# Patient Record
Sex: FEMALE | Race: WHITE | NOT HISPANIC OR LATINO | Employment: UNEMPLOYED | URBAN - METROPOLITAN AREA
[De-identification: names, ages, dates, MRNs, and addresses within clinical notes are randomized per-mention and may not be internally consistent; named-entity substitution may affect disease eponyms.]

---
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
## 2025-04-06 DIAGNOSIS — G90.A POTS (POSTURAL ORTHOSTATIC TACHYCARDIA SYNDROME): ICD-10-CM

## 2025-04-06 RX ORDER — MIDODRINE HYDROCHLORIDE 5 MG/1
5 TABLET ORAL
Qty: 270 TABLET | Refills: 1 | Status: SHIPPED | OUTPATIENT
Start: 2025-04-06

## 2025-05-19 ENCOUNTER — VIRTUAL VISIT (OUTPATIENT)
Dept: FAMILY MEDICINE | Facility: CLINIC | Age: 20
End: 2025-05-19
Payer: COMMERCIAL

## 2025-05-19 DIAGNOSIS — G90.A POTS (POSTURAL ORTHOSTATIC TACHYCARDIA SYNDROME): Primary | ICD-10-CM

## 2025-05-19 PROCEDURE — 98005 SYNCH AUDIO-VIDEO EST LOW 20: CPT | Performed by: PHYSICIAN ASSISTANT

## 2025-05-19 NOTE — LETTER
2025    Farzana Benito   2005        To Whom it May Concern;    Please excuse Farzana Benito from work Thursday 5/15/25, 25 and 25 due to illness. She has a diagnosis of Postural Orthostatic Tachycardia Syndrome (POTS) and having a current exacerbation causing dizziness and nausea leading to inability to work.       Sincerely,        Kristen M. Kehr, PA-C

## 2025-05-19 NOTE — PROGRESS NOTES
Farzana is a 19 year old who is being evaluated via a billable video visit.    How would you like to obtain your AVS? MyChart  If the video visit is dropped, the invitation should be resent by: Send to e-mail at: [email or web address]  Will anyone else be joining your video visit? No      Assessment & Plan     POTS (postural orthostatic tachycardia syndrome)  Letter provided for her time out of work for the past few days. She already has medications and using her current treatments recommended by [facility]. If she will need long term conditions or restrictions, she will need to return to her treating provider.                 Subjective   Farzana is a 19 year old, presenting for the following health issues:  Letter for School/Work (Need letter for work missed 3 days of work due flare up of POTS. )        5/19/2025     5:22 PM   Additional Questions   Roomed by Matthew OLIVEIRA MA         5/19/2025   Forms   Any forms needing to be completed Yes     HPI      Farzana is here today due to POTS. She was diagnosed and treated for this condition through [facility].   She has seen different providers in the clinic and a refill of the midodrine was given at her routine appointment. She uses this daily along with salt intake to try to control symptoms. She is currently having an exacerbation for unknown reasons, despite her use of medications and doing her best to try to increase salt and restrict activities. She is requesting a letter for her days out of work. She feels that she can return to work tomorrow.         Review of Systems  Constitutional, HEENT, cardiovascular, pulmonary, GI, , musculoskeletal, neuro, skin, endocrine and psych systems are negative, except as otherwise noted.      Objective           Vitals:  No vitals were obtained today due to virtual visit.    Physical Exam   GENERAL: alert and no distress  EYES: Eyes grossly normal to inspection.  No discharge or erythema, or obvious scleral/conjunctival abnormalities.  RESP: No  audible wheeze, cough, or visible cyanosis.    SKIN: Visible skin clear. No significant rash, abnormal pigmentation or lesions.  NEURO: Cranial nerves grossly intact.  Mentation and speech appropriate for age.  PSYCH: Appropriate affect, tone, and pace of words          Video-Visit Details    Type of service:  Video Visit   Originating Location (pt. Location): Home    Distant Location (provider location):  On-site  Platform used for Video Visit: Viv  Signed Electronically by: Kristen M. Kehr, PA-C

## 2025-07-16 ENCOUNTER — OFFICE VISIT (OUTPATIENT)
Dept: PEDIATRICS | Facility: CLINIC | Age: 20
End: 2025-07-16

## 2025-07-16 VITALS
SYSTOLIC BLOOD PRESSURE: 123 MMHG | TEMPERATURE: 98.4 F | OXYGEN SATURATION: 100 % | HEIGHT: 65 IN | RESPIRATION RATE: 22 BRPM | WEIGHT: 130 LBS | HEART RATE: 82 BPM | BODY MASS INDEX: 21.66 KG/M2 | DIASTOLIC BLOOD PRESSURE: 68 MMHG
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
## 2025-07-16 PROCEDURE — 99213 OFFICE O/P EST LOW 20 MIN: CPT | Mod: 25 | Performed by: PHYSICIAN ASSISTANT

## 2025-07-16 PROCEDURE — 90471 IMMUNIZATION ADMIN: CPT | Performed by: PHYSICIAN ASSISTANT

## 2025-07-16 PROCEDURE — 90715 TDAP VACCINE 7 YRS/> IM: CPT | Performed by: PHYSICIAN ASSISTANT

## 2025-07-16 ASSESSMENT — PAIN SCALES - GENERAL: PAINLEVEL_OUTOF10: NO PAIN (0)

## 2025-07-16 NOTE — PROGRESS NOTES
"  Assessment & Plan     Cat bite, initial encounter  Discussed concern of infection to soft tissues, deep tissues and joint spaces from a cat bite can occur within hours. The bite is currently 7 hours old at this time and has been cleaned once at the time of the bite.  Advised xray and Farzana declined today.  Wounds were cleaned and dressed in clinic today.  Advised close monitoring and if there is evidence of pain, swelling, redness to skin or other concerns to be seen urgently in an ER setting for evaluation.    - TDAP 7+ (ADACEL,BOOSTRIX)  - amoxicillin-clavulanate (AUGMENTIN) 875-125 MG tablet; Take 1 tablet by mouth 2 times daily for 10 days.        Subjective   Farzana is a 20 year old, presenting for the following health issues:  Cat Bite      7/16/2025     7:55 AM   Additional Questions   Roomed by vipul   Accompanied by self         7/16/2025   Forms   Any forms needing to be completed Yes     History of Present Illness       Reason for visit:  Cat bite at work  Symptom onset:  Today  Symptoms include:  Cat bite  Symptom intensity:  Mild  Symptom progression:  Staying the same  Had these symptoms before:  No  What makes it worse:  Using hand  What makes it better:  Ice   She is taking medications regularly.          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  She had a bite from a female cat approximately 7 hours ago this morning.  They cleaned the area with soap and water immediately.  She has felt some pain, but nothing significant.  No fever. No issues with movement of her hands for the remainder of her shift.  Her last tdap was 8/2016.                Objective    /68   Pulse 82   Temp 98.4  F (36.9  C) (Tympanic)   Resp 22   Ht 5' 5\" (1.651 m)   Wt 130 lb (59 kg)   LMP 05/12/2025 (Approximate)   SpO2 100%   BMI 21.63 kg/m    Body mass index is 21.63 kg/m .  Physical Exam  Musculoskeletal:        Hands:         GENERAL: alert and no distress  SKIN: Left hand with " superficial puncture wound between 1-2nd fingers and volar aspect of wrist at base of thumb and mid wrist.  Multiple superficial abrasions of bilateral hands and wrists. No erythema or induration of skin around puncture wound sites or abrasions.   MS:  Skin as above. Full range of motion of left wrist, hand and fingers. Able to manipulate left thumb and fingers without pain, thumb to finger ROM normal.             Signed Electronically by: Marianne Le PA-C